# Patient Record
Sex: MALE | Race: WHITE | URBAN - METROPOLITAN AREA
[De-identification: names, ages, dates, MRNs, and addresses within clinical notes are randomized per-mention and may not be internally consistent; named-entity substitution may affect disease eponyms.]

---

## 2023-01-05 ENCOUNTER — WEB ENCOUNTER (OUTPATIENT)
Dept: URBAN - METROPOLITAN AREA CLINIC 98 | Facility: CLINIC | Age: 53
End: 2023-01-05

## 2023-01-10 ENCOUNTER — OFFICE VISIT (OUTPATIENT)
Dept: URBAN - METROPOLITAN AREA CLINIC 98 | Facility: CLINIC | Age: 53
End: 2023-01-10
Payer: COMMERCIAL

## 2023-01-10 VITALS
TEMPERATURE: 97.5 F | HEIGHT: 66 IN | DIASTOLIC BLOOD PRESSURE: 75 MMHG | BODY MASS INDEX: 22.5 KG/M2 | RESPIRATION RATE: 65 BRPM | SYSTOLIC BLOOD PRESSURE: 114 MMHG | WEIGHT: 140 LBS

## 2023-01-10 DIAGNOSIS — Z12.11 COLON CANCER SCREENING: ICD-10-CM

## 2023-01-10 PROCEDURE — 99203 OFFICE O/P NEW LOW 30 MIN: CPT | Performed by: INTERNAL MEDICINE

## 2023-01-10 PROCEDURE — 99243 OFF/OP CNSLTJ NEW/EST LOW 30: CPT | Performed by: INTERNAL MEDICINE

## 2023-01-10 RX ORDER — SODIUM, POTASSIUM,MAG SULFATES 17.5-3.13G
354ML SOLUTION, RECONSTITUTED, ORAL ORAL
Qty: 345 MILLILITER | Refills: 0 | OUTPATIENT
Start: 2023-01-10 | End: 2023-01-11

## 2023-01-10 NOTE — HPI-TODAY'S VISIT:
Patient originally from [place], referred by Amberly Brothers MD for CRC screening. Copy of this consult OV sent to Dr. Brothers. 51 yo pt due for CRC screening. Has been asx from GI point of view. No FHx CC / pp / IBD / GI malignancies. Denies change in bowel pattern, abdominal pain nor GIB. NO constitutional nor cardiorespiratory sxs. Normal CPE 2/22. Had mild COVID-19 7/22 and has received COVID-19 vaccine + booster.

## 2023-03-06 ENCOUNTER — CLAIMS CREATED FROM THE CLAIM WINDOW (OUTPATIENT)
Dept: URBAN - METROPOLITAN AREA CLINIC 4 | Facility: CLINIC | Age: 53
End: 2023-03-06
Payer: COMMERCIAL

## 2023-03-06 ENCOUNTER — OFFICE VISIT (OUTPATIENT)
Dept: URBAN - METROPOLITAN AREA SURGERY CENTER 18 | Facility: SURGERY CENTER | Age: 53
End: 2023-03-06
Payer: COMMERCIAL

## 2023-03-06 DIAGNOSIS — D12.0 BENIGN NEOPLASM OF CECUM: ICD-10-CM

## 2023-03-06 DIAGNOSIS — D12.0 ADENOMA OF CECUM: ICD-10-CM

## 2023-03-06 DIAGNOSIS — Z12.11 COLON CANCER SCREENING: ICD-10-CM

## 2023-03-06 PROCEDURE — 45385 COLONOSCOPY W/LESION REMOVAL: CPT | Performed by: INTERNAL MEDICINE

## 2023-03-06 PROCEDURE — 88305 TISSUE EXAM BY PATHOLOGIST: CPT | Performed by: PATHOLOGY

## 2023-03-06 PROCEDURE — G8907 PT DOC NO EVENTS ON DISCHARG: HCPCS | Performed by: INTERNAL MEDICINE

## 2023-06-13 ENCOUNTER — OFFICE VISIT (OUTPATIENT)
Dept: URBAN - METROPOLITAN AREA TELEHEALTH 2 | Facility: TELEHEALTH | Age: 53
End: 2023-06-13
Payer: COMMERCIAL

## 2023-06-13 DIAGNOSIS — Z86.010 PERSONAL HISTORY OF COLONIC POLYPS: ICD-10-CM

## 2023-06-13 DIAGNOSIS — R74.01 ELEVATED TRANSAMINASE LEVEL: ICD-10-CM

## 2023-06-13 PROCEDURE — M1008 <50% TOTAL PT OUTPT RA ENCTS: HCPCS | Performed by: INTERNAL MEDICINE

## 2023-06-13 PROCEDURE — 99214 OFFICE O/P EST MOD 30 MIN: CPT | Performed by: INTERNAL MEDICINE

## 2023-06-13 RX ORDER — ROSUVASTATIN CALCIUM 20 MG/1
1 TABLET TABLET, COATED ORAL ONCE A DAY
Status: ACTIVE | COMMUNITY
Start: 2023-06-20

## 2023-06-13 RX ORDER — FINASTERIDE 5 MG/1
1 TABLET TABLET, FILM COATED ORAL ONCE A DAY
Status: ACTIVE | COMMUNITY
Start: 2023-06-20

## 2023-06-13 NOTE — HPI-TODAY'S VISIT:
This is a telehealth OV to which patient has agreed to. Limitations of telehealth discussed, pt understands and agrees to proceed. Patient's @ home during this virtual OV.  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 Today, he comes for evaluation of elevated transaminases and R-sided back pain. Pain is unrelated to eating and unchanged after bm's. Exacerbated w bending over. No urologic sxs.  Denies change in bowel patter nor GIB. NO constitutional nor cardiorespiratory sxs. Recent lab w PCP 3/23: normal x for HLD and started on Rosuvastatin. Normal CPE 2/22.  No other complaints.

## 2023-06-19 ENCOUNTER — TELEPHONE ENCOUNTER (OUTPATIENT)
Dept: URBAN - METROPOLITAN AREA CLINIC 98 | Facility: CLINIC | Age: 53
End: 2023-06-19

## 2023-06-20 ENCOUNTER — LAB OUTSIDE AN ENCOUNTER (OUTPATIENT)
Dept: URBAN - METROPOLITAN AREA TELEHEALTH 2 | Facility: TELEHEALTH | Age: 53
End: 2023-06-20

## 2023-06-20 PROBLEM — 428283002: Status: ACTIVE | Noted: 2023-06-20

## 2023-06-22 ENCOUNTER — TELEPHONE ENCOUNTER (OUTPATIENT)
Dept: URBAN - METROPOLITAN AREA CLINIC 23 | Facility: CLINIC | Age: 53
End: 2023-06-22

## 2023-06-26 ENCOUNTER — LAB OUTSIDE AN ENCOUNTER (OUTPATIENT)
Dept: URBAN - METROPOLITAN AREA CLINIC 98 | Facility: CLINIC | Age: 53
End: 2023-06-26

## 2023-06-29 ENCOUNTER — TELEPHONE ENCOUNTER (OUTPATIENT)
Dept: URBAN - METROPOLITAN AREA CLINIC 98 | Facility: CLINIC | Age: 53
End: 2023-06-29

## 2023-06-29 LAB
ALBUMIN/GLOBULIN RATIO: 2.1
ALBUMIN: 5.1
ALKALINE PHOSPHATASE: 71
ALT: 50
ANA SCREEN, IFA: POSITIVE
AST: 36
BILIRUBIN, DIRECT: 0.1
BILIRUBIN, INDIRECT: 0.4
BILIRUBIN, TOTAL: 0.5
DONOR, HEPATITIS C ANTIBODY (ANTI-HCV): NONREACTIVE
FIB 4 INDEX: 1.88
GLOBULIN: 2.4
HEPATITIS B CORE AB TOTAL: (no result)
HEPATITIS B SURFACE AB, QN: <5
HEPATITIS B SURFACE ANTIGEN: (no result)
IMMUNOGLOBULIN G, QN, SERUM: 1121
MITOCHONDRIAL (M2) ANTIBODY: <=20
PLATELET COUNT: 141
PROTEIN, TOTAL: 7.5

## 2023-07-01 ENCOUNTER — TELEPHONE ENCOUNTER (OUTPATIENT)
Dept: URBAN - METROPOLITAN AREA CLINIC 98 | Facility: CLINIC | Age: 53
End: 2023-07-01

## 2023-07-01 ENCOUNTER — LAB OUTSIDE AN ENCOUNTER (OUTPATIENT)
Dept: URBAN - METROPOLITAN AREA CLINIC 98 | Facility: CLINIC | Age: 53
End: 2023-07-01

## 2023-07-06 ENCOUNTER — OFFICE VISIT (OUTPATIENT)
Dept: URBAN - METROPOLITAN AREA CLINIC 98 | Facility: CLINIC | Age: 53
End: 2023-07-06
Payer: COMMERCIAL

## 2023-07-06 ENCOUNTER — WEB ENCOUNTER (OUTPATIENT)
Dept: URBAN - METROPOLITAN AREA CLINIC 98 | Facility: CLINIC | Age: 53
End: 2023-07-06

## 2023-07-06 VITALS
TEMPERATURE: 97.4 F | DIASTOLIC BLOOD PRESSURE: 74 MMHG | HEART RATE: 69 BPM | SYSTOLIC BLOOD PRESSURE: 115 MMHG | WEIGHT: 136.8 LBS | HEIGHT: 66 IN | BODY MASS INDEX: 21.98 KG/M2

## 2023-07-06 DIAGNOSIS — K76.0 NAFLD (NONALCOHOLIC FATTY LIVER DISEASE): ICD-10-CM

## 2023-07-06 DIAGNOSIS — K62.5 RECTAL BLEEDING: ICD-10-CM

## 2023-07-06 PROBLEM — 1231824009: Status: ACTIVE | Noted: 2023-07-06

## 2023-07-06 PROCEDURE — 99214 OFFICE O/P EST MOD 30 MIN: CPT | Performed by: INTERNAL MEDICINE

## 2023-07-06 RX ORDER — ROSUVASTATIN CALCIUM 20 MG/1
1 TABLET TABLET, COATED ORAL ONCE A DAY
Status: ACTIVE | COMMUNITY
Start: 2023-06-20

## 2023-07-06 RX ORDER — FINASTERIDE 5 MG/1
1 TABLET TABLET, FILM COATED ORAL ONCE A DAY
Status: ACTIVE | COMMUNITY
Start: 2023-06-20

## 2023-07-06 RX ORDER — HYDROCORTISONE ACETATE 25 MG/1
1 SUPPOSITORY SUPPOSITORY RECTAL ONCE A DAY
Qty: 10 SUPPOSITORIES | Refills: 1 | OUTPATIENT
Start: 2023-07-06 | End: 2023-07-26

## 2023-07-06 NOTE — HPI-TODAY'S VISIT:
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 Today, he comes for evaluation of  rectal bleeding. He reports being on a vegetarian diet ( having moderate amount of beets with his diet ) since his prior colonoscopy. For past 3 days, he has noted  some blood in stools and toilet paper, wo sarita hematochezia nor melenic stools. Has had no rectal pain, and reports ongoing R-sided abdominal pain, wo constitutional  nor urologic sxs. Has been on no ASA / NSAIDs.  He has an Abdomen MRI scheduled for next week. Abdominal pain is unrelated to eating and unchanged after bm's. Exacerbated w bending over. No urologic sxs. RUQ-US 6/23: normal x for increased echogenicity of the pancreas. NO constitutional nor cardiorespiratory sxs. Recent lab w PCP 3/23: normal x for HLD on Rosuvastatin. Normal CPE 2/22.  No other complaints. No recent labs.

## 2023-07-06 NOTE — PHYSICAL EXAM GASTROINTESTINAL
Abdomen , soft, nontender, nondistended , no guarding or rigidity , no masses palpable , normal bowel sounds , Liver and Spleen , no hepatomegaly present , no hepatosplenomegaly , liver nontender , spleen not palpable. GREGORIA: normal perianal area, brown stools wo bleeding. No rectal lesions felt.

## 2023-07-11 ENCOUNTER — OFFICE VISIT (OUTPATIENT)
Dept: URBAN - METROPOLITAN AREA CLINIC 97 | Facility: CLINIC | Age: 53
End: 2023-07-11

## 2023-07-11 LAB
A/G RATIO: 2.2
ALBUMIN: 4.7
ALKALINE PHOSPHATASE: 72
ALT (SGPT): 20
AST (SGOT): 20
BILIRUBIN, TOTAL: 0.3
BUN/CREATININE RATIO: 15
BUN: 15
CALCIUM: 9.3
CARBON DIOXIDE, TOTAL: 22
CHLORIDE: 99
CREATININE: 1.01
EGFR: 89
ELF(TM) SCORE: 8.52
FERRITIN, SERUM: 515
FIB-4 INDEX: 1.39
GLOBULIN, TOTAL: 2.1
GLUCOSE: 104
HEMATOCRIT: 40.1
HEMOGLOBIN: 13.6
IRON BIND.CAP.(TIBC): 257
IRON SATURATION: 25
IRON: 63
MCH: 30.7
MCHC: 33.9
MCV: 91
NRBC: (no result)
PLATELETS: 167
POTASSIUM: 4.4
PROTEIN, TOTAL: 6.8
RBC: 4.43
RDW: 13.4
SODIUM: 137
UIBC: 194
WBC: 5.8

## 2023-07-19 ENCOUNTER — OFFICE VISIT (OUTPATIENT)
Dept: URBAN - METROPOLITAN AREA CLINIC 98 | Facility: CLINIC | Age: 53
End: 2023-07-19
Payer: COMMERCIAL

## 2023-07-19 ENCOUNTER — DASHBOARD ENCOUNTERS (OUTPATIENT)
Age: 53
End: 2023-07-19

## 2023-07-19 VITALS
SYSTOLIC BLOOD PRESSURE: 105 MMHG | TEMPERATURE: 97.2 F | DIASTOLIC BLOOD PRESSURE: 62 MMHG | HEIGHT: 66 IN | BODY MASS INDEX: 21.57 KG/M2 | WEIGHT: 134.2 LBS | HEART RATE: 61 BPM

## 2023-07-19 DIAGNOSIS — K76.0 NAFLD (NONALCOHOLIC FATTY LIVER DISEASE): ICD-10-CM

## 2023-07-19 PROCEDURE — 99214 OFFICE O/P EST MOD 30 MIN: CPT | Performed by: INTERNAL MEDICINE

## 2023-07-19 RX ORDER — FINASTERIDE 5 MG/1
1 TABLET TABLET, FILM COATED ORAL ONCE A DAY
Status: ON HOLD | COMMUNITY
Start: 2023-06-20

## 2023-07-19 RX ORDER — ROSUVASTATIN CALCIUM 20 MG/1
1 TABLET TABLET, COATED ORAL ONCE A DAY
Status: ON HOLD | COMMUNITY
Start: 2023-06-20

## 2023-07-19 RX ORDER — HYDROCORTISONE ACETATE 25 MG/1
1 SUPPOSITORY SUPPOSITORY RECTAL ONCE A DAY
Qty: 10 SUPPOSITORIES | Refills: 1 | Status: ON HOLD | COMMUNITY
Start: 2023-07-06 | End: 2023-07-26

## 2023-07-19 NOTE — HPI-TODAY'S VISIT:
53 yo pt here for follow up after colonoscopy 3/23: SSA poly in cecum and I - E Hrrds. Today, he comes for evaluation of  rectal bleeding. He reports today , no more rectal bleeding. Has   been  on a vegetarian diet since his prior colonoscopy, and doing quite well w it. Has lost some weight. Recent labs w his PCP: improved HLD, off statin.  Has had no rectal  or abdominal pain, wo constitutional  nor urologic sxs. Has been on no ASA / NSAIDs.    No urologic sxs. RUQ-US 6/23: normal x for increased echogenicity of the pancreas. NO constitutional nor cardiorespiratory sxs. Recent labs: normal Fe, ferritin 523, normal CBC, CMP, IgG and F0. Normal CPE 2/22.  No other complaints.

## 2023-09-06 ENCOUNTER — OFFICE VISIT (OUTPATIENT)
Dept: URBAN - METROPOLITAN AREA CLINIC 98 | Facility: CLINIC | Age: 53
End: 2023-09-06